# Patient Record
Sex: FEMALE | Race: WHITE | HISPANIC OR LATINO | URBAN - METROPOLITAN AREA
[De-identification: names, ages, dates, MRNs, and addresses within clinical notes are randomized per-mention and may not be internally consistent; named-entity substitution may affect disease eponyms.]

---

## 2021-11-19 ENCOUNTER — EMERGENCY (EMERGENCY)
Facility: HOSPITAL | Age: 76
LOS: 1 days | Discharge: HOME | End: 2021-11-19
Attending: EMERGENCY MEDICINE
Payer: COMMERCIAL

## 2021-11-19 VITALS
RESPIRATION RATE: 16 BRPM | HEART RATE: 88 BPM | SYSTOLIC BLOOD PRESSURE: 123 MMHG | TEMPERATURE: 98 F | OXYGEN SATURATION: 99 % | WEIGHT: 130.07 LBS | HEIGHT: 62 IN | DIASTOLIC BLOOD PRESSURE: 78 MMHG

## 2021-11-19 PROCEDURE — 99284 EMERGENCY DEPT VISIT MOD MDM: CPT

## 2021-11-19 PROCEDURE — 71045 X-RAY EXAM CHEST 1 VIEW: CPT

## 2021-11-19 PROCEDURE — 71250 CT THORAX DX C-: CPT | Mod: MA

## 2021-11-19 PROCEDURE — 71250 CT THORAX DX C-: CPT | Mod: 26,MA

## 2021-11-19 PROCEDURE — 71045 X-RAY EXAM CHEST 1 VIEW: CPT | Mod: 26

## 2021-11-19 PROCEDURE — 99284 EMERGENCY DEPT VISIT MOD MDM: CPT | Mod: 25

## 2021-11-19 RX ORDER — ACETAMINOPHEN 500 MG
650 TABLET ORAL ONCE
Refills: 0 | Status: COMPLETED | OUTPATIENT
Start: 2021-11-19 | End: 2021-11-19

## 2021-11-19 RX ADMIN — Medication 650 MILLIGRAM(S): at 12:57

## 2021-11-19 NOTE — ED PROVIDER NOTE - PATIENT PORTAL LINK FT
You can access the FollowMyHealth Patient Portal offered by [facility] by registering at the following website: [email or web address]. By joining [facility]’s FollowMyHealth portal, you will also be able to view your health information using other applications (apps) compatible with our system.
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
## 2021-11-19 NOTE — ED PROVIDER NOTE - WR ORDER ID 1
Patient received first dose of eye drops. Dr. Denisse Benson in to see patient.  States patient already dilated in office and no need to wait for all the 3 doses of eye drops to be given before surgery [id number]
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
## 2021-11-19 NOTE — ED PROVIDER NOTE - NS ED ROS FT
Constitutional: no fever, no chills  Head: NC, AT  Eyes: no redness   ENMT: no nasal congestion/drainage, no sore throat   CV: + chest pain, no edema  Resp: no cough, no dyspnea  GI: no abdominal pain, no nausea, no vomiting, no diarrhea  : no dysuria  Skin: no lesions, no rashes   Neuro: no LOC, no headache, no sensory deficits, no weakness
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
## 2021-11-19 NOTE — ED PROVIDER NOTE - PHYSICAL EXAMINATION
General: well appearing, NAD  Head: NC, AT  EENT: no scleral icterus, no mid-spinal tenderness or step-offs   Cardiac: RRR, no apparent murmurs, no lower extremity edema  Respiratory: CTABL, no respiratory distress   Abdomen: soft, ND, NT, nonperitonitic  MSK/Vascular: full ROM, soft compartments, warm extremities, no chest wall abrasions or ecchymosis   Neuro: AAOx3, sensation to light touch intact  Psych: calm, cooperative

## 2022-10-17 PROBLEM — I10 ESSENTIAL (PRIMARY) HYPERTENSION: Chronic | Status: ACTIVE | Noted: 2021-11-19

## 2022-10-26 PROBLEM — Z00.00 ENCOUNTER FOR PREVENTIVE HEALTH EXAMINATION: Status: ACTIVE | Noted: 2022-10-26

## 2022-11-09 ENCOUNTER — OFFICE (OUTPATIENT)
Dept: URBAN - METROPOLITAN AREA CLINIC 94 | Facility: CLINIC | Age: 77
Setting detail: OPHTHALMOLOGY
End: 2022-11-09
Payer: MEDICARE

## 2022-11-09 DIAGNOSIS — H40.1122: ICD-10-CM

## 2022-11-09 DIAGNOSIS — H04.121: ICD-10-CM

## 2022-11-09 DIAGNOSIS — H52.4: ICD-10-CM

## 2022-11-09 DIAGNOSIS — H04.122: ICD-10-CM

## 2022-11-09 DIAGNOSIS — H04.123: ICD-10-CM

## 2022-11-09 DIAGNOSIS — H40.1112: ICD-10-CM

## 2022-11-09 PROCEDURE — 83861 MICROFLUID ANALY TEARS: CPT | Performed by: OPHTHALMOLOGY

## 2022-11-09 PROCEDURE — 92083 EXTENDED VISUAL FIELD XM: CPT | Performed by: OPHTHALMOLOGY

## 2022-11-09 PROCEDURE — 92015 DETERMINE REFRACTIVE STATE: CPT | Performed by: OPHTHALMOLOGY

## 2022-11-09 PROCEDURE — 99213 OFFICE O/P EST LOW 20 MIN: CPT | Performed by: OPHTHALMOLOGY

## 2022-11-09 PROCEDURE — 92133 CPTRZD OPH DX IMG PST SGM ON: CPT | Performed by: OPHTHALMOLOGY

## 2022-11-09 ASSESSMENT — REFRACTION_MANIFEST
OD_ADD: +2.50
OD_ADD: +2.50
OS_AXIS: 080
OS_VA1: 20/30
OD_CYLINDER: -2.50
OD_VA1: 20/30
OS_VA1: 20/20
OD_AXIS: 110
OD_SPHERE: +0.50
OS_SPHERE: +1.25
OS_SPHERE: +0.50
OD_CYLINDER: -1.75
OD_SPHERE: +1.25
OS_AXIS: 70
OS_AXIS: 075
OD_VA1: 20/30+
OS_ADD: +2.75
OS_CYLINDER: -2.25
OS_ADD: +2.50
OD_CYLINDER: -3.25
OS_CYLINDER: -3.50
OD_VA1: 20/30-2
OD_SPHERE: +1.25
OD_ADD: +2.75
OS_CYLINDER: -2.25
OS_SPHERE: +1.00
OD_AXIS: 120
OS_ADD: +2.50
OS_VA1: 20/25
OD_AXIS: 120

## 2022-11-09 ASSESSMENT — REFRACTION_CURRENTRX
OD_ADD: +2.00
OD_CYLINDER: -2.75
OD_SPHERE: +0.75
OS_CYLINDER: -2.25
OS_SPHERE: +1.25
OS_AXIS: 071
OD_OVR_VA: 20/
OS_CYLINDER: -2.50
OS_CYLINDER: -2.00
OS_OVR_VA: 20/
OD_OVR_VA: 20/
OS_VPRISM_DIRECTION: PROGS
OS_ADD: +2.50
OD_VPRISM_DIRECTION: PROGS
OS_ADD: +2.00
OD_OVR_VA: 20/
OD_SPHERE: +1.50
OS_ADD: +2.50
OD_SPHERE: +1.25
OD_VPRISM_DIRECTION: PROGS
OD_AXIS: 121
OD_AXIS: 117
OD_VPRISM_DIRECTION: PROGS
OD_AXIS: 129
OS_AXIS: 074
OD_CYLINDER: -2.50
OS_OVR_VA: 20/
OD_CYLINDER: -1.75
OD_ADD: +2.50
OD_ADD: +2.50
OS_AXIS: 070
OS_VPRISM_DIRECTION: PROGS
OS_VPRISM_DIRECTION: PROGS
OS_OVR_VA: 20/
OS_SPHERE: +1.25
OS_SPHERE: +0.75

## 2022-11-09 ASSESSMENT — KERATOMETRY
OD_AXISANGLE_DEGREES: 025
OD_K2POWER_DIOPTERS: 43.50
METHOD_AUTO_MANUAL: AUTO
OS_K2POWER_DIOPTERS: 44.00
OS_K1POWER_DIOPTERS: 41.25
OS_AXISANGLE_DEGREES: 166
OD_K1POWER_DIOPTERS: 40.75

## 2022-11-09 ASSESSMENT — REFRACTION_AUTOREFRACTION
OD_CYLINDER: -3.50
OS_SPHERE: +1.50
OS_CYLINDER: -4.00
OD_SPHERE: +1.75
OS_AXIS: 077
OD_AXIS: 111

## 2022-11-09 ASSESSMENT — SPHEQUIV_DERIVED
OS_SPHEQUIV: -0.625
OD_SPHEQUIV: -0.375
OD_SPHEQUIV: 0
OS_SPHEQUIV: -0.5
OS_SPHEQUIV: -0.125
OS_SPHEQUIV: -0.5
OD_SPHEQUIV: 0
OD_SPHEQUIV: -0.375

## 2022-11-09 ASSESSMENT — AXIALLENGTH_DERIVED
OS_AL: 23.9679
OS_AL: 24.1196
OD_AL: 24.2615
OD_AL: 24.108
OS_AL: 24.1196
OD_AL: 24.2615
OS_AL: 24.1707
OD_AL: 24.108

## 2022-11-09 ASSESSMENT — CONFRONTATIONAL VISUAL FIELD TEST (CVF)
OD_FINDINGS: FULL
OS_FINDINGS: FULL

## 2022-11-09 ASSESSMENT — PACHYMETRY
OS_CT_UM: 517
OS_CT_CORRECTION: 2
OD_CT_UM: 515
OD_CT_CORRECTION: 2

## 2022-11-09 ASSESSMENT — TONOMETRY
OS_IOP_MMHG: 15
OD_IOP_MMHG: 13

## 2022-11-09 ASSESSMENT — SUPERFICIAL PUNCTATE KERATITIS (SPK)
OD_SPK: T 1+
OS_SPK: T

## 2022-11-09 ASSESSMENT — VISUAL ACUITY
OS_BCVA: 20/30
OD_BCVA: 20/50-1

## 2022-12-02 ENCOUNTER — APPOINTMENT (OUTPATIENT)
Dept: NEUROLOGY | Facility: CLINIC | Age: 77
End: 2022-12-02

## 2022-12-02 VITALS
DIASTOLIC BLOOD PRESSURE: 64 MMHG | HEIGHT: 60 IN | SYSTOLIC BLOOD PRESSURE: 126 MMHG | WEIGHT: 114 LBS | BODY MASS INDEX: 22.38 KG/M2

## 2022-12-02 DIAGNOSIS — Z86.69 PERSONAL HISTORY OF OTHER DISEASES OF THE NERVOUS SYSTEM AND SENSE ORGANS: ICD-10-CM

## 2022-12-02 DIAGNOSIS — Z78.9 OTHER SPECIFIED HEALTH STATUS: ICD-10-CM

## 2022-12-02 DIAGNOSIS — Z81.8 FAMILY HISTORY OF OTHER MENTAL AND BEHAVIORAL DISORDERS: ICD-10-CM

## 2022-12-02 DIAGNOSIS — Z86.39 PERSONAL HISTORY OF OTHER ENDOCRINE, NUTRITIONAL AND METABOLIC DISEASE: ICD-10-CM

## 2022-12-02 PROCEDURE — 99204 OFFICE O/P NEW MOD 45 MIN: CPT

## 2022-12-02 NOTE — ASSESSMENT
[FreeTextEntry1] : This is a 77-year-old woman with perceived memory problems.  I think it is likely due to poor attention because of anxiety and grieving.  At this time I would not start any medication but would prefer to reevaluate 3 months.  I will see her that and have asked her to call me in the interim with any problems, questions or concerns.

## 2022-12-02 NOTE — REVIEW OF SYSTEMS
[Memory Lapses or Loss] : memory loss [As Noted in HPI] : as noted in HPI [Anxiety] : anxiety [Negative] : Heme/Lymph

## 2022-12-02 NOTE — HISTORY OF PRESENT ILLNESS
[FreeTextEntry1] : Initial office visit December 2, 2022:\par This is a 77-year-old woman who presents today for evaluation of memory loss.  She states that she is forgetting everything.  She gets where she places things and forgets to do things at times.  She recently lost her  and also has fair amount of anxiety she states.  She keeps active by gardening at home.  She seems very concerned about her memory.  She is here today for neurologic evaluation.

## 2022-12-02 NOTE — CONSULT LETTER
[Dear  ___] : Dear  [unfilled], [Consult Letter:] : I had the pleasure of evaluating your patient, [unfilled]. [Please see my note below.] : Please see my note below. [Consult Closing:] : Thank you very much for allowing me to participate in the care of this patient.  If you have any questions, please do not hesitate to contact me. [Sincerely,] : Sincerely, [FreeTextEntry3] : Vladimir Moss M.D., Ph.D. DPN-N\par [facility]\par Neurology at [place]\par Medical Director of Stroke Services\par [facility]\par

## 2022-12-02 NOTE — PHYSICAL EXAM
[General Appearance - Alert] : alert [General Appearance - In No Acute Distress] : in no acute distress [General Appearance - Well Nourished] : well nourished [General Appearance - Well Developed] : well developed [Person] : oriented to person [Place] : oriented to place [Time] : oriented to time [Short Term Intact] : short term memory intact [Remote Intact] : remote memory intact [Registration Intact] : recent registration memory intact [Span Intact] : the attention span was normal [Concentration Intact] : normal concentrating ability [Visual Intact] : visual attention was ~T not ~L decreased [Naming Objects] : no difficulty naming common objects [Repeating Phrases] : no difficulty repeating a phrase [Fluency] : fluency intact [Comprehension] : comprehension intact [Current Events] : adequate knowledge of current events [Past History] : adequate knowledge of personal past history [Cranial Nerves Optic (II)] : visual acuity intact bilaterally,  visual fields full to confrontation, pupils equal round and reactive to light [Cranial Nerves Oculomotor (III)] : extraocular motion intact [Cranial Nerves Trigeminal (V)] : facial sensation intact symmetrically [Cranial Nerves Facial (VII)] : face symmetrical [Cranial Nerves Vestibulocochlear (VIII)] : hearing was intact bilaterally [Cranial Nerves Glossopharyngeal (IX)] : tongue and palate midline [Cranial Nerves Accessory (XI - Cranial And Spinal)] : head turning and shoulder shrug symmetric [Cranial Nerves Hypoglossal (XII)] : there was no tongue deviation with protrusion [Motor Tone] : muscle tone was normal in all four extremities [Motor Strength] : muscle strength was normal in all four extremities [Involuntary Movements] : no involuntary movements were seen [No Muscle Atrophy] : normal bulk in all four extremities [Paresis Pronator Drift Right-Sided] : no pronator drift on the right [Paresis Pronator Drift Left-Sided] : no pronator drift on the left [Motor Strength Upper Extremities Bilaterally] : strength was normal in both upper extremities [Motor Strength Lower Extremities Bilaterally] : strength was normal in both lower extremities [Sensation Tactile Decrease] : light touch was intact [Sensation Pain / Temperature Decrease] : pain and temperature was intact [Sensation Vibration Decrease] : vibration was intact [Proprioception] : proprioception was intact [Abnormal Walk] : normal gait [Balance] : balance was intact [Tremor] : no tremor present [2+] : Patella left 2+ [FreeTextEntry4] : 2 out of 3 recall [Sclera] : the sclera and conjunctiva were normal [PERRL With Normal Accommodation] : pupils were equal in size, round, reactive to light, with normal accommodation [Extraocular Movements] : extraocular movements were intact [No APD] : no afferent pupillary defect [No ALTAF] : no internuclear ophthalmoplegia [Full Visual Field] : full visual field

## 2023-03-06 ENCOUNTER — OFFICE (OUTPATIENT)
Dept: URBAN - METROPOLITAN AREA CLINIC 94 | Facility: CLINIC | Age: 78
Setting detail: OPHTHALMOLOGY
End: 2023-03-06
Payer: MEDICARE

## 2023-03-06 ENCOUNTER — RX ONLY (RX ONLY)
Age: 78
End: 2023-03-06

## 2023-03-06 DIAGNOSIS — Z96.1: ICD-10-CM

## 2023-03-06 DIAGNOSIS — H04.123: ICD-10-CM

## 2023-03-06 DIAGNOSIS — H40.1112: ICD-10-CM

## 2023-03-06 DIAGNOSIS — H04.121: ICD-10-CM

## 2023-03-06 DIAGNOSIS — H04.122: ICD-10-CM

## 2023-03-06 DIAGNOSIS — H40.1122: ICD-10-CM

## 2023-03-06 PROCEDURE — 99213 OFFICE O/P EST LOW 20 MIN: CPT | Performed by: OPHTHALMOLOGY

## 2023-03-06 ASSESSMENT — REFRACTION_CURRENTRX
OD_CYLINDER: -2.50
OD_AXIS: 129
OD_ADD: +2.00
OD_CYLINDER: -2.75
OD_CYLINDER: -1.75
OD_ADD: +2.50
OD_OVR_VA: 20/
OS_SPHERE: +1.25
OD_SPHERE: +1.50
OS_CYLINDER: -2.50
OS_SPHERE: +1.25
OD_SPHERE: +0.75
OS_AXIS: 071
OS_VPRISM_DIRECTION: PROGS
OD_AXIS: 121
OS_AXIS: 070
OD_AXIS: 117
OS_OVR_VA: 20/
OS_AXIS: 074
OD_SPHERE: +1.25
OD_VPRISM_DIRECTION: PROGS
OS_ADD: +2.50
OD_VPRISM_DIRECTION: PROGS
OS_OVR_VA: 20/
OS_VPRISM_DIRECTION: PROGS
OD_OVR_VA: 20/
OS_ADD: +2.50
OD_VPRISM_DIRECTION: PROGS
OS_CYLINDER: -2.25
OD_ADD: +2.50
OS_OVR_VA: 20/
OS_CYLINDER: -2.00
OS_ADD: +2.00
OS_SPHERE: +0.75
OD_OVR_VA: 20/
OS_VPRISM_DIRECTION: PROGS

## 2023-03-06 ASSESSMENT — REFRACTION_MANIFEST
OD_VA1: 20/30
OS_AXIS: 075
OS_SPHERE: +1.00
OS_ADD: +2.50
OD_SPHERE: +1.25
OD_AXIS: 120
OS_CYLINDER: -2.25
OD_SPHERE: +0.50
OD_CYLINDER: -1.75
OD_VA1: 20/30-2
OD_ADD: +2.75
OD_CYLINDER: -2.50
OD_SPHERE: +1.25
OD_VA1: 20/30+
OD_AXIS: 110
OS_AXIS: 080
OS_VA1: 20/25
OS_ADD: +2.50
OS_ADD: +2.75
OD_ADD: +2.50
OS_VA1: 20/30
OS_AXIS: 70
OD_CYLINDER: -3.25
OS_CYLINDER: -2.25
OS_SPHERE: +1.25
OS_CYLINDER: -3.50
OD_AXIS: 120
OD_ADD: +2.50
OS_SPHERE: +0.50
OS_VA1: 20/20

## 2023-03-06 ASSESSMENT — REFRACTION_AUTOREFRACTION
OD_AXIS: 111
OD_CYLINDER: -3.50
OD_SPHERE: +1.75
OS_AXIS: 077
OS_CYLINDER: -4.00
OS_SPHERE: +1.50

## 2023-03-06 ASSESSMENT — AXIALLENGTH_DERIVED
OD_AL: 24.108
OS_AL: 24.1196
OS_AL: 24.1196
OD_AL: 24.108
OD_AL: 24.2615
OD_AL: 24.2615
OS_AL: 24.1707
OS_AL: 23.9679

## 2023-03-06 ASSESSMENT — KERATOMETRY
OS_K2POWER_DIOPTERS: 44.00
METHOD_AUTO_MANUAL: AUTO
OD_AXISANGLE_DEGREES: 025
OS_AXISANGLE_DEGREES: 166
OD_K2POWER_DIOPTERS: 43.50
OD_K1POWER_DIOPTERS: 40.75
OS_K1POWER_DIOPTERS: 41.25

## 2023-03-06 ASSESSMENT — SPHEQUIV_DERIVED
OS_SPHEQUIV: -0.125
OS_SPHEQUIV: -0.625
OD_SPHEQUIV: 0
OS_SPHEQUIV: -0.5
OD_SPHEQUIV: 0
OD_SPHEQUIV: -0.375
OD_SPHEQUIV: -0.375
OS_SPHEQUIV: -0.5

## 2023-03-06 ASSESSMENT — PACHYMETRY
OD_CT_UM: 515
OS_CT_CORRECTION: 2
OD_CT_CORRECTION: 2
OS_CT_UM: 517

## 2023-03-06 ASSESSMENT — SUPERFICIAL PUNCTATE KERATITIS (SPK)
OD_SPK: T 1+
OS_SPK: T

## 2023-03-06 ASSESSMENT — CONFRONTATIONAL VISUAL FIELD TEST (CVF)
OS_FINDINGS: FULL
OD_FINDINGS: FULL

## 2023-03-06 ASSESSMENT — TONOMETRY
OS_IOP_MMHG: 19
OD_IOP_MMHG: 15

## 2023-03-06 ASSESSMENT — VISUAL ACUITY
OD_BCVA: 20/25
OS_BCVA: 20/25

## 2023-03-21 ENCOUNTER — APPOINTMENT (OUTPATIENT)
Dept: NEUROLOGY | Facility: CLINIC | Age: 78
End: 2023-03-21
Payer: MEDICARE

## 2023-03-21 VITALS
WEIGHT: 115 LBS | DIASTOLIC BLOOD PRESSURE: 70 MMHG | SYSTOLIC BLOOD PRESSURE: 124 MMHG | BODY MASS INDEX: 22.58 KG/M2 | HEIGHT: 60 IN

## 2023-03-21 PROCEDURE — 99213 OFFICE O/P EST LOW 20 MIN: CPT

## 2023-03-21 NOTE — ASSESSMENT
[FreeTextEntry1] : This is a 78-year-old woman who is having a memory problems in the setting of grieving her  who recently passed away.  At this point still feel that this is more due to bereavement and grief rather than due to neurodegenerative disease.  She also complains of vertigo for which she has seen ENT and is undergoing vestibular therapy.  Will see her back in the office in 6 months but asked her to call me in the interim with any problems questions or concerns.

## 2023-03-21 NOTE — HISTORY OF PRESENT ILLNESS
[FreeTextEntry1] : Initial office visit December 2, 2022:\par This is a 77-year-old woman who presents today for evaluation of memory loss.  She states that she is forgetting everything.  She gets where she places things and forgets to do things at times.  She recently lost her  and also has fair amount of anxiety she states.  She keeps active by gardening at home.  She seems very concerned about her memory.  She is here today for neurologic evaluation.\par \par Follow-up March 21, 2023:\par This is a 78-year-old woman who presents today with memory loss.  She is doing a little bit better.  She is not as forgetful.  This is likely a due to the grief that she is experiencing after her  passed away.  She does seem to ruminate on things about him.  She is living in her home and things are constantly reminding her of him which makes her sad.  She is here today for neurologic follow-up.

## 2023-03-21 NOTE — PHYSICAL EXAM
[Person] : oriented to person [Place] : oriented to place [Time] : oriented to time [Short Term Intact] : short term memory intact [Remote Intact] : remote memory intact [Registration Intact] : recent registration memory intact [Span Intact] : the attention span was normal [Concentration Intact] : normal concentrating ability [Visual Intact] : visual attention was ~T not ~L decreased [Naming Objects] : no difficulty naming common objects [Repeating Phrases] : no difficulty repeating a phrase [Fluency] : fluency intact [Comprehension] : comprehension intact [Current Events] : adequate knowledge of current events [Past History] : adequate knowledge of personal past history [Cranial Nerves Optic (II)] : visual acuity intact bilaterally,  visual fields full to confrontation, pupils equal round and reactive to light [Cranial Nerves Oculomotor (III)] : extraocular motion intact [Cranial Nerves Trigeminal (V)] : facial sensation intact symmetrically [Cranial Nerves Facial (VII)] : face symmetrical [Cranial Nerves Vestibulocochlear (VIII)] : hearing was intact bilaterally [Cranial Nerves Glossopharyngeal (IX)] : tongue and palate midline [Cranial Nerves Accessory (XI - Cranial And Spinal)] : head turning and shoulder shrug symmetric [Cranial Nerves Hypoglossal (XII)] : there was no tongue deviation with protrusion [Motor Tone] : muscle tone was normal in all four extremities [Motor Strength] : muscle strength was normal in all four extremities [Involuntary Movements] : no involuntary movements were seen [No Muscle Atrophy] : normal bulk in all four extremities [Paresis Pronator Drift Left-Sided] : no pronator drift on the left [Paresis Pronator Drift Right-Sided] : no pronator drift on the right [Motor Strength Upper Extremities Bilaterally] : strength was normal in both upper extremities [Motor Strength Lower Extremities Bilaterally] : strength was normal in both lower extremities [Sensation Tactile Decrease] : light touch was intact [Sensation Pain / Temperature Decrease] : pain and temperature was intact [Sensation Vibration Decrease] : vibration was intact [Proprioception] : proprioception was intact [Abnormal Walk] : normal gait [Balance] : balance was intact [Tremor] : no tremor present [2+] : Patella left 2+ [FreeTextEntry4] : 2 out of 3 recall [Sclera] : the sclera and conjunctiva were normal [PERRL With Normal Accommodation] : pupils were equal in size, round, reactive to light, with normal accommodation [Extraocular Movements] : extraocular movements were intact [No APD] : no afferent pupillary defect [No ALTAF] : no internuclear ophthalmoplegia [Full Visual Field] : full visual field

## 2023-03-21 NOTE — CONSULT LETTER
[Dear  ___] : Dear  [unfilled], [Courtesy Letter:] : I had the pleasure of seeing your patient, [unfilled], in my office today. [Please see my note below.] : Please see my note below. [Consult Closing:] : Thank you very much for allowing me to participate in the care of this patient.  If you have any questions, please do not hesitate to contact me. [Sincerely,] : Sincerely, [FreeTextEntry3] : Vladimir Moss M.D., Ph.D. DPN-N\par [facility]\par Neurology at [place]\par Medical Director of Stroke Services\par [facility]\par

## 2023-09-13 ENCOUNTER — OFFICE (OUTPATIENT)
Dept: URBAN - METROPOLITAN AREA CLINIC 94 | Facility: CLINIC | Age: 78
Setting detail: OPHTHALMOLOGY
End: 2023-09-13
Payer: MEDICARE

## 2023-09-13 DIAGNOSIS — H40.1112: ICD-10-CM

## 2023-09-13 DIAGNOSIS — H40.1122: ICD-10-CM

## 2023-09-13 DIAGNOSIS — H04.123: ICD-10-CM

## 2023-09-13 PROCEDURE — 92250 FUNDUS PHOTOGRAPHY W/I&R: CPT | Performed by: OPHTHALMOLOGY

## 2023-09-13 PROCEDURE — 92014 COMPRE OPH EXAM EST PT 1/>: CPT | Performed by: OPHTHALMOLOGY

## 2023-09-13 ASSESSMENT — KERATOMETRY
OD_AXISANGLE_DEGREES: 029
OS_AXISANGLE_DEGREES: 167
OD_K1POWER_DIOPTERS: 41.00
METHOD_AUTO_MANUAL: AUTO
OS_K2POWER_DIOPTERS: 44.25
OD_K2POWER_DIOPTERS: 43.50
OS_K1POWER_DIOPTERS: 41.25

## 2023-09-13 ASSESSMENT — REFRACTION_CURRENTRX
OD_CYLINDER: -2.75
OS_ADD: +2.00
OD_VPRISM_DIRECTION: PROGS
OS_VPRISM_DIRECTION: PROGS
OS_OVR_VA: 20/
OD_SPHERE: +1.25
OS_AXIS: 071
OD_SPHERE: +1.50
OS_CYLINDER: -2.00
OS_CYLINDER: -2.50
OS_OVR_VA: 20/
OS_ADD: +2.50
OD_OVR_VA: 20/
OD_AXIS: 129
OD_VPRISM_DIRECTION: PROGS
OD_ADD: +2.50
OS_SPHERE: +0.75
OD_ADD: +2.50
OD_CYLINDER: -1.75
OS_AXIS: 070
OS_ADD: +2.50
OS_VPRISM_DIRECTION: PROGS
OD_AXIS: 121
OS_SPHERE: +1.25
OD_OVR_VA: 20/
OS_CYLINDER: -2.25
OS_AXIS: 074
OS_SPHERE: +1.25
OD_VPRISM_DIRECTION: PROGS
OS_OVR_VA: 20/
OS_VPRISM_DIRECTION: PROGS
OD_OVR_VA: 20/
OD_ADD: +2.00
OD_AXIS: 117
OD_SPHERE: +0.75
OD_CYLINDER: -2.50

## 2023-09-13 ASSESSMENT — REFRACTION_AUTOREFRACTION
OS_SPHERE: +1.50
OD_AXIS: 115
OS_CYLINDER: -4.00
OS_AXIS: 078
OD_CYLINDER: -2.75
OD_SPHERE: +1.00

## 2023-09-13 ASSESSMENT — SPHEQUIV_DERIVED
OD_SPHEQUIV: 0
OS_SPHEQUIV: -0.125
OS_SPHEQUIV: -0.625
OD_SPHEQUIV: -0.375
OD_SPHEQUIV: -0.375
OS_SPHEQUIV: -0.5
OS_SPHEQUIV: -0.5
OD_SPHEQUIV: -0.375

## 2023-09-13 ASSESSMENT — CONFRONTATIONAL VISUAL FIELD TEST (CVF)
OS_FINDINGS: FULL
OD_FINDINGS: FULL

## 2023-09-13 ASSESSMENT — TONOMETRY
OS_IOP_MMHG: 12
OD_IOP_MMHG: 13
OD_IOP_MMHG: 12
OS_IOP_MMHG: 14

## 2023-09-13 ASSESSMENT — REFRACTION_MANIFEST
OD_CYLINDER: -1.75
OD_ADD: +2.75
OD_ADD: +2.50
OS_ADD: +2.75
OD_CYLINDER: -3.25
OS_SPHERE: +0.50
OS_AXIS: 70
OS_ADD: +2.50
OS_VA1: 20/25
OD_VA1: 20/30
OS_VA1: 20/30
OD_ADD: +2.50
OD_SPHERE: +1.25
OS_CYLINDER: -3.50
OS_SPHERE: +1.00
OD_SPHERE: +0.50
OS_VA1: 20/20
OS_SPHERE: +1.25
OS_AXIS: 075
OD_AXIS: 120
OD_CYLINDER: -2.50
OD_SPHERE: +1.25
OD_VA1: 20/30-2
OD_VA1: 20/30+
OD_AXIS: 120
OS_AXIS: 080
OS_ADD: +2.50
OD_AXIS: 110
OS_CYLINDER: -2.25
OS_CYLINDER: -2.25

## 2023-09-13 ASSESSMENT — AXIALLENGTH_DERIVED
OD_AL: 24.2131
OS_AL: 24.1226
OD_AL: 24.0602
OS_AL: 23.9206
OD_AL: 24.2131
OD_AL: 24.2131
OS_AL: 24.0718
OS_AL: 24.0718

## 2023-09-13 ASSESSMENT — PACHYMETRY
OS_CT_UM: 517
OD_CT_CORRECTION: 2
OS_CT_CORRECTION: 2
OD_CT_UM: 515

## 2023-09-13 ASSESSMENT — SUPERFICIAL PUNCTATE KERATITIS (SPK)
OD_SPK: T 1+
OS_SPK: T

## 2023-09-13 ASSESSMENT — VISUAL ACUITY
OS_BCVA: 20/30-
OD_BCVA: 20/40

## 2023-09-19 ENCOUNTER — APPOINTMENT (OUTPATIENT)
Dept: NEUROLOGY | Facility: CLINIC | Age: 78
End: 2023-09-19
Payer: MEDICARE

## 2023-09-19 VITALS
DIASTOLIC BLOOD PRESSURE: 68 MMHG | SYSTOLIC BLOOD PRESSURE: 120 MMHG | HEIGHT: 60 IN | WEIGHT: 115 LBS | BODY MASS INDEX: 22.58 KG/M2

## 2023-09-19 DIAGNOSIS — R41.3 OTHER AMNESIA: ICD-10-CM

## 2023-09-19 PROCEDURE — 99213 OFFICE O/P EST LOW 20 MIN: CPT

## 2023-10-18 ENCOUNTER — OFFICE (OUTPATIENT)
Dept: URBAN - METROPOLITAN AREA CLINIC 94 | Facility: CLINIC | Age: 78
Setting detail: OPHTHALMOLOGY
End: 2023-10-18
Payer: MEDICARE

## 2023-10-18 ENCOUNTER — ASC (OUTPATIENT)
Dept: URBAN - METROPOLITAN AREA SURGERY 8 | Facility: SURGERY | Age: 78
Setting detail: OPHTHALMOLOGY
End: 2023-10-18
Payer: MEDICARE

## 2023-10-18 DIAGNOSIS — H40.1132: ICD-10-CM

## 2023-10-18 DIAGNOSIS — H40.1112: ICD-10-CM

## 2023-10-18 PROCEDURE — 65855 TRABECULOPLASTY LASER SURG: CPT | Mod: RT | Performed by: OPHTHALMOLOGY

## 2023-10-18 PROCEDURE — 92133 CPTRZD OPH DX IMG PST SGM ON: CPT | Performed by: OPHTHALMOLOGY

## 2023-10-18 PROCEDURE — 92083 EXTENDED VISUAL FIELD XM: CPT | Performed by: OPHTHALMOLOGY

## 2023-10-18 ASSESSMENT — REFRACTION_CURRENTRX
OD_OVR_VA: 20/
OS_VPRISM_DIRECTION: PROGS
OD_CYLINDER: -1.75
OD_CYLINDER: -2.75
OS_SPHERE: +0.75
OD_OVR_VA: 20/
OS_ADD: +2.50
OS_OVR_VA: 20/
OD_VPRISM_DIRECTION: PROGS
OS_AXIS: 070
OD_AXIS: 129
OS_AXIS: 074
OD_OVR_VA: 20/
OD_CYLINDER: -2.50
OS_VPRISM_DIRECTION: PROGS
OS_SPHERE: +1.25
OS_AXIS: 071
OD_ADD: +2.00
OS_CYLINDER: -2.00
OS_CYLINDER: -2.25
OD_ADD: +2.50
OS_CYLINDER: -2.50
OD_AXIS: 117
OS_ADD: +2.50
OD_ADD: +2.50
OD_VPRISM_DIRECTION: PROGS
OS_OVR_VA: 20/
OS_ADD: +2.00
OD_SPHERE: +1.25
OD_SPHERE: +0.75
OD_SPHERE: +1.50
OS_SPHERE: +1.25
OD_AXIS: 121
OS_VPRISM_DIRECTION: PROGS
OD_VPRISM_DIRECTION: PROGS
OS_OVR_VA: 20/

## 2023-10-18 ASSESSMENT — REFRACTION_MANIFEST
OS_ADD: +2.50
OS_SPHERE: +0.50
OS_AXIS: 075
OD_SPHERE: +1.25
OD_CYLINDER: -3.25
OD_AXIS: 120
OS_CYLINDER: -2.25
OS_SPHERE: +1.25
OS_CYLINDER: -2.25
OS_ADD: +2.50
OD_VA1: 20/30
OD_VA1: 20/30-2
OD_ADD: +2.50
OS_SPHERE: +1.00
OD_SPHERE: +1.25
OD_AXIS: 120
OS_ADD: +2.75
OS_AXIS: 080
OD_ADD: +2.50
OD_ADD: +2.75
OS_AXIS: 70
OD_VA1: 20/30+
OD_CYLINDER: -1.75
OD_SPHERE: +0.50
OD_AXIS: 110
OS_VA1: 20/30
OS_CYLINDER: -3.50
OS_VA1: 20/20
OD_CYLINDER: -2.50
OS_VA1: 20/25

## 2023-10-18 ASSESSMENT — PACHYMETRY
OS_CT_CORRECTION: 2
OD_CT_UM: 515
OD_CT_CORRECTION: 2
OS_CT_UM: 517

## 2023-10-18 ASSESSMENT — KERATOMETRY
METHOD_AUTO_MANUAL: AUTO
OS_K1POWER_DIOPTERS: 41.25
OD_K1POWER_DIOPTERS: 41.00
OS_K2POWER_DIOPTERS: 44.25
OD_AXISANGLE_DEGREES: 029
OD_K2POWER_DIOPTERS: 43.50
OS_AXISANGLE_DEGREES: 167

## 2023-10-18 ASSESSMENT — SUPERFICIAL PUNCTATE KERATITIS (SPK)
OS_SPK: T
OD_SPK: T 1+

## 2023-10-18 ASSESSMENT — SPHEQUIV_DERIVED
OS_SPHEQUIV: -0.125
OS_SPHEQUIV: -0.625
OD_SPHEQUIV: 0
OD_SPHEQUIV: -0.375
OS_SPHEQUIV: -0.5
OS_SPHEQUIV: -0.5
OD_SPHEQUIV: -0.375
OD_SPHEQUIV: -0.375

## 2023-10-18 ASSESSMENT — AXIALLENGTH_DERIVED
OS_AL: 24.1226
OD_AL: 24.2131
OS_AL: 24.0718
OS_AL: 24.0718
OS_AL: 23.9206
OD_AL: 24.0602
OD_AL: 24.2131
OD_AL: 24.2131

## 2023-10-18 ASSESSMENT — REFRACTION_AUTOREFRACTION
OD_SPHERE: +1.00
OD_CYLINDER: -2.75
OS_AXIS: 078
OD_AXIS: 115
OS_CYLINDER: -4.00
OS_SPHERE: +1.50

## 2023-10-18 ASSESSMENT — VISUAL ACUITY
OS_BCVA: 20/30-
OD_BCVA: 20/40

## 2023-10-18 ASSESSMENT — TONOMETRY
OS_IOP_MMHG: 15
OD_IOP_MMHG: 14

## 2023-10-18 ASSESSMENT — CONFRONTATIONAL VISUAL FIELD TEST (CVF)
OS_FINDINGS: FULL
OD_FINDINGS: FULL

## 2023-10-19 ENCOUNTER — ASC (OUTPATIENT)
Dept: URBAN - METROPOLITAN AREA SURGERY 8 | Facility: SURGERY | Age: 78
Setting detail: OPHTHALMOLOGY
End: 2023-10-19
Payer: MEDICARE

## 2023-10-19 DIAGNOSIS — H40.1122: ICD-10-CM

## 2023-10-19 PROBLEM — H04.121 DRY EYE SYNDROME LACRIMAL GLAND; RIGHT EYE, LEFT EYE, BOTH EYES: Status: ACTIVE | Noted: 2023-10-18

## 2023-10-19 PROBLEM — H04.122 DRY EYE SYNDROME LACRIMAL GLAND; RIGHT EYE, LEFT EYE, BOTH EYES: Status: ACTIVE | Noted: 2023-10-18

## 2023-10-19 PROBLEM — H04.123 DRY EYE SYNDROME LACRIMAL GLAND; RIGHT EYE, LEFT EYE, BOTH EYES: Status: ACTIVE | Noted: 2023-10-18

## 2023-10-19 PROCEDURE — 65855 TRABECULOPLASTY LASER SURG: CPT | Mod: 79,LT | Performed by: OPHTHALMOLOGY

## 2023-10-19 ASSESSMENT — SPHEQUIV_DERIVED
OD_SPHEQUIV: -0.375
OS_SPHEQUIV: -0.5
OD_SPHEQUIV: 0
OS_SPHEQUIV: -0.125
OD_SPHEQUIV: -0.375
OS_SPHEQUIV: -0.625
OS_SPHEQUIV: -0.5
OD_SPHEQUIV: -0.375

## 2023-10-19 ASSESSMENT — AXIALLENGTH_DERIVED
OD_AL: 24.0602
OD_AL: 24.2131
OD_AL: 24.2131
OS_AL: 24.0718
OS_AL: 24.1226
OS_AL: 24.0718
OS_AL: 23.9206
OD_AL: 24.2131

## 2023-10-19 ASSESSMENT — REFRACTION_CURRENTRX
OS_OVR_VA: 20/
OS_AXIS: 071
OS_OVR_VA: 20/
OS_SPHERE: +0.75
OS_VPRISM_DIRECTION: PROGS
OD_ADD: +2.50
OD_AXIS: 121
OD_OVR_VA: 20/
OD_SPHERE: +0.75
OD_AXIS: 117
OD_AXIS: 129
OD_OVR_VA: 20/
OS_AXIS: 070
OD_ADD: +2.50
OS_SPHERE: +1.25
OS_AXIS: 074
OD_CYLINDER: -2.50
OS_CYLINDER: -2.00
OS_OVR_VA: 20/
OS_ADD: +2.50
OD_OVR_VA: 20/
OD_ADD: +2.00
OS_VPRISM_DIRECTION: PROGS
OS_CYLINDER: -2.50
OS_VPRISM_DIRECTION: PROGS
OS_ADD: +2.00
OD_SPHERE: +1.25
OS_ADD: +2.50
OD_VPRISM_DIRECTION: PROGS
OD_VPRISM_DIRECTION: PROGS
OS_SPHERE: +1.25
OD_CYLINDER: -2.75
OD_VPRISM_DIRECTION: PROGS
OS_CYLINDER: -2.25
OD_SPHERE: +1.50
OD_CYLINDER: -1.75

## 2023-10-19 ASSESSMENT — REFRACTION_MANIFEST
OD_SPHERE: +1.25
OD_SPHERE: +1.25
OS_ADD: +2.50
OD_AXIS: 120
OD_ADD: +2.50
OD_AXIS: 120
OD_CYLINDER: -1.75
OD_CYLINDER: -3.25
OS_AXIS: 70
OS_ADD: +2.50
OD_CYLINDER: -2.50
OS_ADD: +2.75
OD_VA1: 20/30+
OD_ADD: +2.75
OD_AXIS: 110
OS_VA1: 20/25
OS_CYLINDER: -2.25
OS_SPHERE: +0.50
OS_VA1: 20/30
OD_VA1: 20/30
OS_AXIS: 080
OS_SPHERE: +1.00
OS_CYLINDER: -3.50
OD_VA1: 20/30-2
OS_VA1: 20/20
OD_SPHERE: +0.50
OS_CYLINDER: -2.25
OD_ADD: +2.50
OS_SPHERE: +1.25
OS_AXIS: 075

## 2023-10-19 ASSESSMENT — REFRACTION_AUTOREFRACTION
OD_AXIS: 115
OS_CYLINDER: -4.00
OD_CYLINDER: -2.75
OS_SPHERE: +1.50
OS_AXIS: 078
OD_SPHERE: +1.00

## 2023-10-19 ASSESSMENT — VISUAL ACUITY
OD_BCVA: 20/40
OS_BCVA: 20/30-

## 2023-10-19 ASSESSMENT — KERATOMETRY
OS_K1POWER_DIOPTERS: 41.25
OD_K2POWER_DIOPTERS: 43.50
OS_K2POWER_DIOPTERS: 44.25
OD_AXISANGLE_DEGREES: 029
METHOD_AUTO_MANUAL: AUTO
OD_K1POWER_DIOPTERS: 41.00
OS_AXISANGLE_DEGREES: 167

## 2024-01-18 ENCOUNTER — OFFICE (OUTPATIENT)
Dept: URBAN - METROPOLITAN AREA CLINIC 94 | Facility: CLINIC | Age: 79
Setting detail: OPHTHALMOLOGY
End: 2024-01-18
Payer: MEDICARE

## 2024-01-18 DIAGNOSIS — H52.4: ICD-10-CM

## 2024-01-18 DIAGNOSIS — H26.491: ICD-10-CM

## 2024-01-18 DIAGNOSIS — H04.123: ICD-10-CM

## 2024-01-18 DIAGNOSIS — H40.1122: ICD-10-CM

## 2024-01-18 DIAGNOSIS — H40.1112: ICD-10-CM

## 2024-01-18 PROCEDURE — 92014 COMPRE OPH EXAM EST PT 1/>: CPT | Performed by: OPHTHALMOLOGY

## 2024-01-18 PROCEDURE — 92015 DETERMINE REFRACTIVE STATE: CPT | Performed by: OPHTHALMOLOGY

## 2024-01-18 ASSESSMENT — REFRACTION_CURRENTRX
OS_SPHERE: +1.25
OS_ADD: +2.50
OD_SPHERE: +1.50
OS_OVR_VA: 20/
OD_ADD: +2.50
OS_ADD: +2.00
OD_OVR_VA: 20/
OD_VPRISM_DIRECTION: PROGS
OD_ADD: +2.50
OS_VPRISM_DIRECTION: PROGS
OS_SPHERE: +0.75
OD_AXIS: 121
OS_CYLINDER: -2.25
OS_ADD: +2.50
OD_VPRISM_DIRECTION: PROGS
OD_OVR_VA: 20/
OD_CYLINDER: -2.50
OD_ADD: +2.00
OD_AXIS: 129
OS_OVR_VA: 20/
OD_AXIS: 117
OD_VPRISM_DIRECTION: PROGS
OS_OVR_VA: 20/
OS_AXIS: 074
OD_SPHERE: +0.75
OD_OVR_VA: 20/
OD_CYLINDER: -2.75
OS_AXIS: 071
OS_CYLINDER: -2.00
OS_SPHERE: +1.25
OS_CYLINDER: -2.50
OS_AXIS: 070
OD_CYLINDER: -1.75
OS_VPRISM_DIRECTION: PROGS
OS_VPRISM_DIRECTION: PROGS
OD_SPHERE: +1.25

## 2024-01-18 ASSESSMENT — REFRACTION_MANIFEST
OD_SPHERE: +1.25
OS_VA1: 20/30
OD_CYLINDER: -2.75
OS_AXIS: 078
OD_VA1: 20/30
OD_ADD: +2.50
OD_ADD: +2.50
OD_VA2: 20/20
OS_CYLINDER: -2.25
OU_VA: 20/20-1
OS_CYLINDER: -3.75
OD_SPHERE: +1.25
OD_CYLINDER: -2.50
OS_SPHERE: +1.00
OS_CYLINDER: -3.50
OD_VA1: 20/30
OD_CYLINDER: -3.25
OD_SPHERE: +1.25
OS_VA1: 20/20
OS_AXIS: 080
OS_ADD: +2.50
OD_AXIS: 112
OD_AXIS: 110
OD_ADD: +2.75
OS_VA2: 20/20
OS_ADD: +2.75
OS_AXIS: 075
OS_SPHERE: +1.25
OS_VA1: 20/25+1
OS_SPHERE: +2.00
OS_ADD: +2.50
OD_VA1: 20/30-2
OD_AXIS: 120

## 2024-01-18 ASSESSMENT — REFRACTION_AUTOREFRACTION
OS_CYLINDER: -4.00
OD_AXIS: 112
OD_CYLINDER: -3.25
OD_SPHERE: +1.50
OS_SPHERE: +2.00
OS_AXIS: 078

## 2024-01-18 ASSESSMENT — SPHEQUIV_DERIVED
OD_SPHEQUIV: -0.125
OD_SPHEQUIV: -0.125
OD_SPHEQUIV: 0
OS_SPHEQUIV: 0
OS_SPHEQUIV: -0.5
OS_SPHEQUIV: -0.125
OS_SPHEQUIV: 0.125
OD_SPHEQUIV: -0.375

## 2024-01-18 ASSESSMENT — SUPERFICIAL PUNCTATE KERATITIS (SPK)
OD_SPK: T 1+
OS_SPK: T

## 2024-01-18 ASSESSMENT — CONFRONTATIONAL VISUAL FIELD TEST (CVF)
OS_FINDINGS: FULL
OD_FINDINGS: FULL

## 2024-03-19 ENCOUNTER — APPOINTMENT (OUTPATIENT)
Dept: NEUROLOGY | Facility: CLINIC | Age: 79
End: 2024-03-19